# Patient Record
Sex: FEMALE | Race: WHITE
[De-identification: names, ages, dates, MRNs, and addresses within clinical notes are randomized per-mention and may not be internally consistent; named-entity substitution may affect disease eponyms.]

---

## 2018-12-09 ENCOUNTER — HOSPITAL ENCOUNTER (OUTPATIENT)
Dept: HOSPITAL 53 - M SFHCLERA | Age: 33
End: 2018-12-09
Attending: PHYSICIAN ASSISTANT
Payer: COMMERCIAL

## 2018-12-09 DIAGNOSIS — R53.83: Primary | ICD-10-CM

## 2018-12-09 LAB
HEMATOCRIT: 40.6 % (ref 36–47)
HEMOGLOBIN: 13.6 G/DL (ref 12–15.5)
MEAN CORPUSCULAR HEMOGLOBIN: 30 PG (ref 27–33)
MEAN CORPUSCULAR HGB CONC: 33.5 G/DL (ref 32–36.5)
MEAN CORPUSCULAR VOLUME: 89.4 FL (ref 80–96)
NRBC BLD AUTO-RTO: 0 % (ref 0–0)
PLATELET COUNT, AUTOMATED: 295 10^3/UL (ref 150–450)
RED BLOOD COUNT: 4.54 10^6/UL (ref 4–5.4)
RED CELL DISTRIBUTION WIDTH: 12.1 % (ref 11.5–14.5)
THYROID STIMULATING HORMONE: 1.29 UIU/ML (ref 0.36–3.74)
WHITE BLOOD COUNT: 10.6 10^3/UL (ref 4–10)

## 2018-12-09 PROCEDURE — 84443 ASSAY THYROID STIM HORMONE: CPT

## 2018-12-10 LAB
25(OH)D3 SERPL-MCNC: 14 NG/ML (ref 30–100)
VITAMIN B12 LEVEL: 620 PG/ML (ref 247–911)

## 2020-06-05 NOTE — ECHO
OUTPATIENT ECHOCARDIOGRAPHIC REPORT

 

DATE OF PROCEDURE:  06/04/2020

 

YOB: 1985

AGE:  34

GENDER:  Female

HEIGHT:  66 inches

WEIGHT: 168  pounds

BODY SURFACE AREA:  1.85 m2

OUTPATIENT:

REFERRING PHYSICIAN:  Karla Hernández MD

INDICATION:  PVCs.

 

MEASUREMENTS:

2-D Measurements:

RV:  3.0 cm

LV:  4.6 cm

Septum:  1.0 cm

Posterior wall:  1.0 cm

Aortic root:  3.0 cm

LA:  3.4 cm

LVEF:  65%

 

Doppler Measurements:

AV:  1.32 m/sec

LVOT:  1.1 m/sec

LVOT diameter:  1.8 cm

MV - E:  79, A:  60, EA ratio:  1.3

Early mitral deceleration time:  172 ms

E prime medial:  13.2, A prime medial:  11, E prime lateral:  14.7

PV:  1.0 m/sec

Pulmonary artery acceleration time:  148 ms

RVSP:  28 mmHg

IVC:  1.6 cm

 

COMMENTS:

Normal sinus rhythm without intraventricular conduction disturbance.  Occasional

isolated PVCs.

 

M-mode and two-dimensional echocardiography was performed along with pulsed,

continuous wave, color flow and tissue Doppler studies.

 

Normal left ventricular size, wall thickness and wall motion.

 

Normal left atrial size and Doppler assessment of LV diastolic function and

estimated mean left atrial pressure.

 

Normal right heart chamber sizes and motion with pulmonary arterial pressure

upper limits of normal.

 

Normal IVC size and collapse against an elevated central venous pressure.

 

Normal aortic dimensions.

 

Normal-appearing aortic valve with trace insufficiency.

 

Normal-appearing mitral valvular apparatus with only trace insufficiency.  No

mitral valve prolapse.

 

Normal-appearing tricuspid valve with very mild insufficiency.

 

No apparent intracardiac mass or pericardial effusion.